# Patient Record
Sex: MALE | Race: WHITE | NOT HISPANIC OR LATINO | Employment: FULL TIME | URBAN - METROPOLITAN AREA
[De-identification: names, ages, dates, MRNs, and addresses within clinical notes are randomized per-mention and may not be internally consistent; named-entity substitution may affect disease eponyms.]

---

## 2021-11-01 ENCOUNTER — NON-PROVIDER VISIT (OUTPATIENT)
Dept: OCCUPATIONAL MEDICINE | Facility: CLINIC | Age: 21
End: 2021-11-01

## 2021-11-01 DIAGNOSIS — Z02.83 ENCOUNTER FOR DRUG SCREENING: Primary | ICD-10-CM

## 2021-11-01 PROCEDURE — 8907 PR URINE COLLECT ONLY: Performed by: PREVENTIVE MEDICINE

## 2021-11-02 ENCOUNTER — OFFICE VISIT (OUTPATIENT)
Dept: OCCUPATIONAL MEDICINE | Facility: CLINIC | Age: 21
End: 2021-11-02

## 2021-11-02 VITALS
DIASTOLIC BLOOD PRESSURE: 72 MMHG | WEIGHT: 236 LBS | BODY MASS INDEX: 30.29 KG/M2 | OXYGEN SATURATION: 97 % | HEIGHT: 74 IN | SYSTOLIC BLOOD PRESSURE: 116 MMHG | HEART RATE: 68 BPM | RESPIRATION RATE: 16 BRPM

## 2021-11-02 DIAGNOSIS — Z02.4 ENCOUNTER FOR COMMERCIAL DRIVER MEDICAL EXAMINATION (CDME): ICD-10-CM

## 2021-11-02 PROCEDURE — 7100 PR DOT PHYSICAL: Performed by: PREVENTIVE MEDICINE

## 2022-06-01 ENCOUNTER — APPOINTMENT (OUTPATIENT)
Dept: RADIOLOGY | Facility: MEDICAL CENTER | Age: 22
End: 2022-06-01
Attending: EMERGENCY MEDICINE
Payer: COMMERCIAL

## 2022-06-01 ENCOUNTER — HOSPITAL ENCOUNTER (OUTPATIENT)
Facility: MEDICAL CENTER | Age: 22
End: 2022-06-02
Attending: EMERGENCY MEDICINE | Admitting: INTERNAL MEDICINE
Payer: COMMERCIAL

## 2022-06-01 DIAGNOSIS — K92.2 GASTROINTESTINAL HEMORRHAGE, UNSPECIFIED GASTROINTESTINAL HEMORRHAGE TYPE: Primary | ICD-10-CM

## 2022-06-01 DIAGNOSIS — Q43.0 MECKEL DIVERTICULUM: ICD-10-CM

## 2022-06-01 DIAGNOSIS — D72.829 LEUKOCYTOSIS, UNSPECIFIED TYPE: ICD-10-CM

## 2022-06-01 DIAGNOSIS — R19.5 LOOSE STOOLS: ICD-10-CM

## 2022-06-01 DIAGNOSIS — R10.9 ACUTE ABDOMINAL PAIN: ICD-10-CM

## 2022-06-01 PROBLEM — R11.2 NAUSEA & VOMITING: Status: ACTIVE | Noted: 2022-06-01

## 2022-06-01 LAB
ALBUMIN SERPL BCP-MCNC: 4.8 G/DL (ref 3.2–4.9)
ALBUMIN/GLOB SERPL: 1.7 G/DL
ALP SERPL-CCNC: 79 U/L (ref 30–99)
ALT SERPL-CCNC: 22 U/L (ref 2–50)
ANION GAP SERPL CALC-SCNC: 12 MMOL/L (ref 7–16)
APPEARANCE UR: CLEAR
AST SERPL-CCNC: 26 U/L (ref 12–45)
BASOPHILS # BLD AUTO: 0.3 % (ref 0–1.8)
BASOPHILS # BLD: 0.04 K/UL (ref 0–0.12)
BILIRUB SERPL-MCNC: 0.8 MG/DL (ref 0.1–1.5)
BILIRUB UR QL STRIP.AUTO: NEGATIVE
BUN SERPL-MCNC: 17 MG/DL (ref 8–22)
CALCIUM SERPL-MCNC: 9.3 MG/DL (ref 8.5–10.5)
CHLORIDE SERPL-SCNC: 101 MMOL/L (ref 96–112)
CK SERPL-CCNC: 155 U/L (ref 0–154)
CO2 SERPL-SCNC: 24 MMOL/L (ref 20–33)
COLOR UR: YELLOW
CREAT SERPL-MCNC: 1.02 MG/DL (ref 0.5–1.4)
EOSINOPHIL # BLD AUTO: 0.02 K/UL (ref 0–0.51)
EOSINOPHIL NFR BLD: 0.2 % (ref 0–6.9)
ERYTHROCYTE [DISTWIDTH] IN BLOOD BY AUTOMATED COUNT: 38.7 FL (ref 35.9–50)
FLUAV RNA SPEC QL NAA+PROBE: NEGATIVE
FLUBV RNA SPEC QL NAA+PROBE: NEGATIVE
G LAMBLIA+C PARVUM AG STL QL RAPID: NORMAL
GFR SERPLBLD CREATININE-BSD FMLA CKD-EPI: 106 ML/MIN/1.73 M 2
GLOBULIN SER CALC-MCNC: 2.8 G/DL (ref 1.9–3.5)
GLUCOSE SERPL-MCNC: 114 MG/DL (ref 65–99)
GLUCOSE UR STRIP.AUTO-MCNC: NEGATIVE MG/DL
HCT VFR BLD AUTO: 47.7 % (ref 42–52)
HEMOCCULT STL QL: POSITIVE
HGB BLD-MCNC: 16.9 G/DL (ref 14–18)
IMM GRANULOCYTES # BLD AUTO: 0.03 K/UL (ref 0–0.11)
IMM GRANULOCYTES NFR BLD AUTO: 0.3 % (ref 0–0.9)
KETONES UR STRIP.AUTO-MCNC: NEGATIVE MG/DL
LEUKOCYTE ESTERASE UR QL STRIP.AUTO: NEGATIVE
LIPASE SERPL-CCNC: 19 U/L (ref 11–82)
LYMPHOCYTES # BLD AUTO: 0.49 K/UL (ref 1–4.8)
LYMPHOCYTES NFR BLD: 4.2 % (ref 22–41)
MCH RBC QN AUTO: 30.6 PG (ref 27–33)
MCHC RBC AUTO-ENTMCNC: 35.4 G/DL (ref 33.7–35.3)
MCV RBC AUTO: 86.3 FL (ref 81.4–97.8)
MICRO URNS: NORMAL
MONOCYTES # BLD AUTO: 0.46 K/UL (ref 0–0.85)
MONOCYTES NFR BLD AUTO: 3.9 % (ref 0–13.4)
NEUTROPHILS # BLD AUTO: 10.67 K/UL (ref 1.82–7.42)
NEUTROPHILS NFR BLD: 91.1 % (ref 44–72)
NITRITE UR QL STRIP.AUTO: NEGATIVE
NRBC # BLD AUTO: 0 K/UL
NRBC BLD-RTO: 0 /100 WBC
PH UR STRIP.AUTO: 7.5 [PH] (ref 5–8)
PLATELET # BLD AUTO: 247 K/UL (ref 164–446)
PMV BLD AUTO: 8.8 FL (ref 9–12.9)
POTASSIUM SERPL-SCNC: 4.5 MMOL/L (ref 3.6–5.5)
PROT SERPL-MCNC: 7.6 G/DL (ref 6–8.2)
PROT UR QL STRIP: NEGATIVE MG/DL
RBC # BLD AUTO: 5.53 M/UL (ref 4.7–6.1)
RBC UR QL AUTO: NEGATIVE
SARS-COV-2 RNA RESP QL NAA+PROBE: NOTDETECTED
SIGNIFICANT IND 70042: NORMAL
SITE SITE: NORMAL
SODIUM SERPL-SCNC: 137 MMOL/L (ref 135–145)
SOURCE SOURCE: NORMAL
SP GR UR STRIP.AUTO: 1.03
SPECIMEN SOURCE: NORMAL
UROBILINOGEN UR STRIP.AUTO-MCNC: 0.2 MG/DL
WBC # BLD AUTO: 11.7 K/UL (ref 4.8–10.8)

## 2022-06-01 PROCEDURE — 87329 GIARDIA AG IA: CPT

## 2022-06-01 PROCEDURE — 82272 OCCULT BLD FECES 1-3 TESTS: CPT

## 2022-06-01 PROCEDURE — 83690 ASSAY OF LIPASE: CPT

## 2022-06-01 PROCEDURE — 99219 PR INITIAL OBSERVATION CARE,LEVL II: CPT | Performed by: INTERNAL MEDICINE

## 2022-06-01 PROCEDURE — 96376 TX/PRO/DX INJ SAME DRUG ADON: CPT

## 2022-06-01 PROCEDURE — G0378 HOSPITAL OBSERVATION PER HR: HCPCS

## 2022-06-01 PROCEDURE — C9803 HOPD COVID-19 SPEC COLLECT: HCPCS | Performed by: EMERGENCY MEDICINE

## 2022-06-01 PROCEDURE — 700105 HCHG RX REV CODE 258: Performed by: INTERNAL MEDICINE

## 2022-06-01 PROCEDURE — 700111 HCHG RX REV CODE 636 W/ 250 OVERRIDE (IP): Performed by: EMERGENCY MEDICINE

## 2022-06-01 PROCEDURE — A9512 TC99M PERTECHNETATE: HCPCS

## 2022-06-01 PROCEDURE — 82550 ASSAY OF CK (CPK): CPT

## 2022-06-01 PROCEDURE — 0240U HCHG SARS-COV-2 COVID-19 NFCT DS RESP RNA 3 TRGT MIC: CPT

## 2022-06-01 PROCEDURE — 99285 EMERGENCY DEPT VISIT HI MDM: CPT

## 2022-06-01 PROCEDURE — 700111 HCHG RX REV CODE 636 W/ 250 OVERRIDE (IP): Performed by: INTERNAL MEDICINE

## 2022-06-01 PROCEDURE — 700105 HCHG RX REV CODE 258: Performed by: EMERGENCY MEDICINE

## 2022-06-01 PROCEDURE — 96375 TX/PRO/DX INJ NEW DRUG ADDON: CPT

## 2022-06-01 PROCEDURE — 36415 COLL VENOUS BLD VENIPUNCTURE: CPT

## 2022-06-01 PROCEDURE — 80053 COMPREHEN METABOLIC PANEL: CPT

## 2022-06-01 PROCEDURE — 700117 HCHG RX CONTRAST REV CODE 255: Performed by: EMERGENCY MEDICINE

## 2022-06-01 PROCEDURE — 96374 THER/PROPH/DIAG INJ IV PUSH: CPT

## 2022-06-01 PROCEDURE — 74177 CT ABD & PELVIS W/CONTRAST: CPT

## 2022-06-01 PROCEDURE — 85025 COMPLETE CBC W/AUTO DIFF WBC: CPT

## 2022-06-01 PROCEDURE — 87328 CRYPTOSPORIDIUM AG IA: CPT

## 2022-06-01 PROCEDURE — 81003 URINALYSIS AUTO W/O SCOPE: CPT

## 2022-06-01 RX ORDER — PROMETHAZINE HYDROCHLORIDE 12.5 MG/1
12.5-25 SUPPOSITORY RECTAL EVERY 4 HOURS PRN
Status: DISCONTINUED | OUTPATIENT
Start: 2022-06-01 | End: 2022-06-02 | Stop reason: HOSPADM

## 2022-06-01 RX ORDER — ONDANSETRON 2 MG/ML
4 INJECTION INTRAMUSCULAR; INTRAVENOUS ONCE
Status: COMPLETED | OUTPATIENT
Start: 2022-06-01 | End: 2022-06-01

## 2022-06-01 RX ORDER — AMOXICILLIN 250 MG
2 CAPSULE ORAL 2 TIMES DAILY
Status: DISCONTINUED | OUTPATIENT
Start: 2022-06-01 | End: 2022-06-02 | Stop reason: HOSPADM

## 2022-06-01 RX ORDER — POLYETHYLENE GLYCOL 3350 17 G/17G
1 POWDER, FOR SOLUTION ORAL
Status: DISCONTINUED | OUTPATIENT
Start: 2022-06-01 | End: 2022-06-02 | Stop reason: HOSPADM

## 2022-06-01 RX ORDER — SODIUM CHLORIDE, SODIUM LACTATE, POTASSIUM CHLORIDE, CALCIUM CHLORIDE 600; 310; 30; 20 MG/100ML; MG/100ML; MG/100ML; MG/100ML
1000 INJECTION, SOLUTION INTRAVENOUS ONCE
Status: COMPLETED | OUTPATIENT
Start: 2022-06-01 | End: 2022-06-01

## 2022-06-01 RX ORDER — PROMETHAZINE HYDROCHLORIDE 25 MG/1
12.5-25 TABLET ORAL EVERY 4 HOURS PRN
Status: DISCONTINUED | OUTPATIENT
Start: 2022-06-01 | End: 2022-06-02 | Stop reason: HOSPADM

## 2022-06-01 RX ORDER — KETOROLAC TROMETHAMINE 30 MG/ML
15 INJECTION, SOLUTION INTRAMUSCULAR; INTRAVENOUS ONCE
Status: COMPLETED | OUTPATIENT
Start: 2022-06-01 | End: 2022-06-01

## 2022-06-01 RX ORDER — HYDRALAZINE HYDROCHLORIDE 20 MG/ML
10 INJECTION INTRAMUSCULAR; INTRAVENOUS EVERY 4 HOURS PRN
Status: DISCONTINUED | OUTPATIENT
Start: 2022-06-01 | End: 2022-06-02 | Stop reason: HOSPADM

## 2022-06-01 RX ORDER — HYDROMORPHONE HYDROCHLORIDE 1 MG/ML
1 INJECTION, SOLUTION INTRAMUSCULAR; INTRAVENOUS; SUBCUTANEOUS ONCE
Status: COMPLETED | OUTPATIENT
Start: 2022-06-01 | End: 2022-06-01

## 2022-06-01 RX ORDER — PROCHLORPERAZINE EDISYLATE 5 MG/ML
5-10 INJECTION INTRAMUSCULAR; INTRAVENOUS EVERY 4 HOURS PRN
Status: DISCONTINUED | OUTPATIENT
Start: 2022-06-01 | End: 2022-06-02 | Stop reason: HOSPADM

## 2022-06-01 RX ORDER — ACETAMINOPHEN 325 MG/1
650 TABLET ORAL EVERY 6 HOURS PRN
Status: DISCONTINUED | OUTPATIENT
Start: 2022-06-01 | End: 2022-06-02 | Stop reason: HOSPADM

## 2022-06-01 RX ORDER — SODIUM CHLORIDE 9 MG/ML
INJECTION, SOLUTION INTRAVENOUS CONTINUOUS
Status: DISCONTINUED | OUTPATIENT
Start: 2022-06-01 | End: 2022-06-02 | Stop reason: HOSPADM

## 2022-06-01 RX ORDER — MORPHINE SULFATE 4 MG/ML
2 INJECTION INTRAVENOUS
Status: DISCONTINUED | OUTPATIENT
Start: 2022-06-01 | End: 2022-06-02 | Stop reason: HOSPADM

## 2022-06-01 RX ORDER — ONDANSETRON 2 MG/ML
4 INJECTION INTRAMUSCULAR; INTRAVENOUS EVERY 4 HOURS PRN
Status: DISCONTINUED | OUTPATIENT
Start: 2022-06-01 | End: 2022-06-02 | Stop reason: HOSPADM

## 2022-06-01 RX ORDER — ONDANSETRON 4 MG/1
4 TABLET, ORALLY DISINTEGRATING ORAL EVERY 4 HOURS PRN
Status: DISCONTINUED | OUTPATIENT
Start: 2022-06-01 | End: 2022-06-02 | Stop reason: HOSPADM

## 2022-06-01 RX ORDER — BISACODYL 10 MG
10 SUPPOSITORY, RECTAL RECTAL
Status: DISCONTINUED | OUTPATIENT
Start: 2022-06-01 | End: 2022-06-02 | Stop reason: HOSPADM

## 2022-06-01 RX ADMIN — KETOROLAC TROMETHAMINE 15 MG: 30 INJECTION, SOLUTION INTRAMUSCULAR at 12:27

## 2022-06-01 RX ADMIN — FAMOTIDINE 20 MG: 10 INJECTION INTRAVENOUS at 17:24

## 2022-06-01 RX ADMIN — SODIUM CHLORIDE, POTASSIUM CHLORIDE, SODIUM LACTATE AND CALCIUM CHLORIDE 1000 ML: 600; 310; 30; 20 INJECTION, SOLUTION INTRAVENOUS at 11:55

## 2022-06-01 RX ADMIN — FAMOTIDINE 20 MG: 10 INJECTION, SOLUTION INTRAVENOUS at 11:55

## 2022-06-01 RX ADMIN — IOHEXOL 80 ML: 350 INJECTION, SOLUTION INTRAVENOUS at 13:11

## 2022-06-01 RX ADMIN — SODIUM CHLORIDE: 9 INJECTION, SOLUTION INTRAVENOUS at 19:55

## 2022-06-01 RX ADMIN — ONDANSETRON 4 MG: 2 INJECTION INTRAMUSCULAR; INTRAVENOUS at 15:26

## 2022-06-01 RX ADMIN — ONDANSETRON 4 MG: 2 INJECTION INTRAMUSCULAR; INTRAVENOUS at 20:01

## 2022-06-01 RX ADMIN — ONDANSETRON 4 MG: 2 INJECTION INTRAMUSCULAR; INTRAVENOUS at 11:55

## 2022-06-01 RX ADMIN — HYDROMORPHONE HYDROCHLORIDE 1 MG: 1 INJECTION, SOLUTION INTRAMUSCULAR; INTRAVENOUS; SUBCUTANEOUS at 14:31

## 2022-06-01 ASSESSMENT — LIFESTYLE VARIABLES
ALCOHOL_USE: YES
HAVE YOU EVER FELT YOU SHOULD CUT DOWN ON YOUR DRINKING: NO
CONSUMPTION TOTAL: NEGATIVE
DOES PATIENT WANT TO STOP DRINKING: NO
TOTAL SCORE: 0
EVER HAD A DRINK FIRST THING IN THE MORNING TO STEADY YOUR NERVES TO GET RID OF A HANGOVER: NO
AVERAGE NUMBER OF DAYS PER WEEK YOU HAVE A DRINK CONTAINING ALCOHOL: 2
TOTAL SCORE: 0
TOTAL SCORE: 0
ON A TYPICAL DAY WHEN YOU DRINK ALCOHOL HOW MANY DRINKS DO YOU HAVE: 3
HOW MANY TIMES IN THE PAST YEAR HAVE YOU HAD 5 OR MORE DRINKS IN A DAY: 0
EVER FELT BAD OR GUILTY ABOUT YOUR DRINKING: NO
HAVE PEOPLE ANNOYED YOU BY CRITICIZING YOUR DRINKING: NO

## 2022-06-01 ASSESSMENT — ENCOUNTER SYMPTOMS
BLOOD IN STOOL: 1
BRUISES/BLEEDS EASILY: 0
COUGH: 0
HEADACHES: 1
DIZZINESS: 1
DOUBLE VISION: 0
NAUSEA: 1
BLURRED VISION: 0
FEVER: 0
MYALGIAS: 0
VOMITING: 1
ABDOMINAL PAIN: 1
HEARTBURN: 1
DIARRHEA: 1
NECK PAIN: 1
PALPITATIONS: 0
CHILLS: 0

## 2022-06-01 ASSESSMENT — PATIENT HEALTH QUESTIONNAIRE - PHQ9
1. LITTLE INTEREST OR PLEASURE IN DOING THINGS: NOT AT ALL
SUM OF ALL RESPONSES TO PHQ9 QUESTIONS 1 AND 2: 0
2. FEELING DOWN, DEPRESSED, IRRITABLE, OR HOPELESS: NOT AT ALL
1. LITTLE INTEREST OR PLEASURE IN DOING THINGS: NOT AT ALL
2. FEELING DOWN, DEPRESSED, IRRITABLE, OR HOPELESS: NOT AT ALL
SUM OF ALL RESPONSES TO PHQ9 QUESTIONS 1 AND 2: 0

## 2022-06-01 ASSESSMENT — PAIN DESCRIPTION - PAIN TYPE
TYPE: ACUTE PAIN
TYPE: ACUTE PAIN

## 2022-06-01 ASSESSMENT — FIBROSIS 4 INDEX: FIB4 SCORE: 0.49

## 2022-06-01 NOTE — ED TRIAGE NOTES
"Chief Complaint   Patient presents with   • Nausea/Vomiting/Diarrhea     Diarrhea x5 months. He now notes that stool is liquid and black.    • Flank Pain     Bilateral that started this AM. Denies pain with urination or blood in urine   • Dizziness   • Headache       Patient to triage ambulatory with a steady gait, AAOx4, Appropriate precautions in place.     Explained wait time and triage process. Placed back in lobby. Told to notify ED tech or RN of any changes, verbalized understanding.    /82   Pulse (!) 109   Temp 36.3 °C (97.3 °F) (Temporal)   Resp 14   Ht 1.88 m (6' 2\")   Wt 111 kg (245 lb 9.5 oz)   SpO2 98%   BMI 31.53 kg/m²     "

## 2022-06-01 NOTE — ED PROVIDER NOTES
ED Provider Note    CHIEF COMPLAINT  Chief Complaint   Patient presents with   • Nausea/Vomiting/Diarrhea     Diarrhea x5 months. He now notes that stool is liquid and black.    • Flank Pain     Bilateral that started this AM. Denies pain with urination or blood in urine   • Dizziness   • Headache       HPI    Primary care provider: None  Means of arrival: POV  History obtained from: Patient and Mother  History limited by: Nothing    Mando Saleh is a 21 y.o. male who presents with loose stools.  Fairly constant over the last 5 months.  Rarely has had a formed bowel movement in that time window.  Has not seen a doctor for this yet.  Since last night after eating sushi he has had several episodes of emesis and nausea.  He also has bilateral low back/flank pain.  No alleviating measures noted.  No daily meds.  No surgical history.  No falls or injuries or trauma.  Diarrhea today was looser than usual and darker in color almost black.  No bright red blood per rectum.  No family history of IBD or early colon malignancy.  No aggravating factors noted.    REVIEW OF SYSTEMS  Constitutional: Negative for fever or chills.   HENT: Negative for rhinorrhea or sore throat.  Respiratory: Negative for cough or shortness of breath.    Cardiovascular: Negative for chest pain or syncope.   Gastrointestinal: Positive for loose stools chronically for 5 months, darker today, and with new nausea and vomiting since last night.  Genitourinary: Negative for dysuria or flank pain.   Musculoskeletal: Positive for bilateral low back pain, negative for extremity pain or swelling.  Skin: Negative for itching or rash.   Neurological: Negative for sensory or motor changes.   See HPI for further details. All other systems are negative.     PAST MEDICAL HISTORY  No daily meds, loose stools over the last 5 months.    PAST FAMILY HISTORY  History reviewed. No pertinent family history.    SOCIAL HISTORY  Social History     Tobacco Use   • Smoking  "status: Never Smoker   • Smokeless tobacco: Never Used   Substance and Sexual Activity   • Alcohol use: Yes     Comment: 1-2   • Drug use: Never   • Sexual activity: Not on file       SURGICAL HISTORY  patient denies any surgical history    CURRENT MEDICATIONS  No daily meds.    ALLERGIES  Allergies   Allergen Reactions   • Pertussis Vaccine        PHYSICAL EXAM  VITAL SIGNS: BP (!) 99/55   Pulse 99   Temp 36.7 °C (98.1 °F) (Temporal)   Resp 16   Ht 1.88 m (6' 2\")   Wt 111 kg (245 lb 9.5 oz)   SpO2 92%   BMI 31.53 kg/m²    Pulse ox interpretation: On room air, I interpret this pulse ox as normal.  Constitutional: Well-developed, well-nourished. Sitting up.   HEENT: Normocephalic, atraumatic. Posterior pharynx clear, mucous membranes quite dry.  Eyes:  EOMI. Normal sclerae.  Neck: Supple, nontender.  Chest/Pulmonary: Clear to ausculation bilaterally, no wheezes or rhonchi.  Cardiovascular: Regular rate and rhythm, no murmur.   Abdomen: Soft, mild generalized tenderness; no rebound, guarding, or masses.  Back: No CVA or midline tenderness.   Musculoskeletal: No deformity or edema.  Neuro: Clear speech, alert, no focal weakness or asymmetry.  Psych: Normal mood and affect.  Skin: No rashes, warm and dry.      DIAGNOSTIC STUDIES / PROCEDURES    LABS & EKG  Results for orders placed or performed during the hospital encounter of 06/01/22   CBC WITH DIFFERENTIAL   Result Value Ref Range    WBC 11.7 (H) 4.8 - 10.8 K/uL    RBC 5.53 4.70 - 6.10 M/uL    Hemoglobin 16.9 14.0 - 18.0 g/dL    Hematocrit 47.7 42.0 - 52.0 %    MCV 86.3 81.4 - 97.8 fL    MCH 30.6 27.0 - 33.0 pg    MCHC 35.4 (H) 33.7 - 35.3 g/dL    RDW 38.7 35.9 - 50.0 fL    Platelet Count 247 164 - 446 K/uL    MPV 8.8 (L) 9.0 - 12.9 fL    Neutrophils-Polys 91.10 (H) 44.00 - 72.00 %    Lymphocytes 4.20 (L) 22.00 - 41.00 %    Monocytes 3.90 0.00 - 13.40 %    Eosinophils 0.20 0.00 - 6.90 %    Basophils 0.30 0.00 - 1.80 %    Immature Granulocytes 0.30 0.00 - 0.90 " %    Nucleated RBC 0.00 /100 WBC    Neutrophils (Absolute) 10.67 (H) 1.82 - 7.42 K/uL    Lymphs (Absolute) 0.49 (L) 1.00 - 4.80 K/uL    Monos (Absolute) 0.46 0.00 - 0.85 K/uL    Eos (Absolute) 0.02 0.00 - 0.51 K/uL    Baso (Absolute) 0.04 0.00 - 0.12 K/uL    Immature Granulocytes (abs) 0.03 0.00 - 0.11 K/uL    NRBC (Absolute) 0.00 K/uL   COMP METABOLIC PANEL   Result Value Ref Range    Sodium 137 135 - 145 mmol/L    Potassium 4.5 3.6 - 5.5 mmol/L    Chloride 101 96 - 112 mmol/L    Co2 24 20 - 33 mmol/L    Anion Gap 12.0 7.0 - 16.0    Glucose 114 (H) 65 - 99 mg/dL    Bun 17 8 - 22 mg/dL    Creatinine 1.02 0.50 - 1.40 mg/dL    Calcium 9.3 8.5 - 10.5 mg/dL    AST(SGOT) 26 12 - 45 U/L    ALT(SGPT) 22 2 - 50 U/L    Alkaline Phosphatase 79 30 - 99 U/L    Total Bilirubin 0.8 0.1 - 1.5 mg/dL    Albumin 4.8 3.2 - 4.9 g/dL    Total Protein 7.6 6.0 - 8.2 g/dL    Globulin 2.8 1.9 - 3.5 g/dL    A-G Ratio 1.7 g/dL   LIPASE   Result Value Ref Range    Lipase 19 11 - 82 U/L   URINALYSIS    Specimen: Urine, Clean Catch   Result Value Ref Range    Color Yellow     Character Clear     Specific Gravity 1.028 <1.035    Ph 7.5 5.0 - 8.0    Glucose Negative Negative mg/dL    Ketones Negative Negative mg/dL    Protein Negative Negative mg/dL    Bilirubin Negative Negative    Urobilinogen, Urine 0.2 Negative    Nitrite Negative Negative    Leukocyte Esterase Negative Negative    Occult Blood Negative Negative    Micro Urine Req see below    ESTIMATED GFR   Result Value Ref Range    GFR (CKD-EPI) 106 >60 mL/min/1.73 m 2   OCCULT BLOOD STOOL   Result Value Ref Range    Occult Blood Feces Positive (A) Negative   CoV-2 and Flu A/B by PCR (Roche/[employer])    Specimen: Nasopharyngeal; Respirate   Result Value Ref Range    SARS-CoV-2 Source NP Swab    CREATINE KINASE   Result Value Ref Range    CPK Total 155 (H) 0 - 154 U/L   CRYPTO/GIARDIA RAPID ASSAY    Specimen: Stool   Result Value Ref Range    Significant Indicator NEG     Source STL     Site -      Ova And Parasites Antigen Eia -        RADIOLOGY  CT-ABDOMEN-PELVIS WITH   Final Result      There is a fluid-containing blind ending pouch arising from the antimesenteric side of the distal ileum. Tracking along its lateral margin is a normal-appearing appendix. This could be a Meckel's diverticulum. If there is clinical suspicion a Tc-NA    pertechnetate scan could be of use.      NM-MECKELS SCAN    (Results Pending)       COURSE & MEDICAL DECISION MAKING    This is a 21 y.o. male who presents with loose stools for months now with nausea and vomiting and melenic appearing stools for the last day.    Differential Diagnosis includes but is not limited to:  IBD, IBS, infection, GI bleed, peptic ulcer disease    ED Course:  21-year-old male with above concerning presentation.  Hemoccult positive melenic loose stools, white count slightly elevated plan advanced imaging since he has no outpatient follow-up.  No indication for transfusion at this time.  IV antacids and pain medications given.    Interestingly, the patient has what appears to be a possible Meckel's diverticulum on his CT.  Given he does have some active bleeding, his abdomen is benign on serial reassessments and his vitals are stable but I plan to hospitalize the patient for further work-up with a nuclear medicine study and then definitive care.  Hospitalist consulted they will kindly admit for further work-up and treatment.  The patient is hemodynamically stable for admission in guarded condition.  I will continue IV meds for symptom relief, patient and mother understand and agree with plan of care.    Medications   morphine 4 MG/ML injection 2 mg (2 mg Intravenous Wasted 6/1/22 1542)   lactated ringers (LR) bolus (0 mL Intravenous Stopped 6/1/22 1447)   ondansetron (ZOFRAN) syringe/vial injection 4 mg (4 mg Intravenous Given 6/1/22 1155)   famotidine (PEPCID) injection 20 mg (20 mg Intravenous Given 6/1/22 1155)   ketorolac (TORADOL) injection 15 mg (15  mg Intravenous Given 6/1/22 1227)   iohexol (OMNIPAQUE) 350 mg/mL (IV) (80 mL Intravenous Given 6/1/22 1311)   HYDROmorphone (Dilaudid) injection 1 mg (1 mg Intravenous Given 6/1/22 1431)   ondansetron (ZOFRAN) syringe/vial injection 4 mg (4 mg Intravenous Given 6/1/22 1526)       FINAL IMPRESSION  1. Gastrointestinal hemorrhage, unspecified gastrointestinal hemorrhage type    2. Acute abdominal pain    3. Loose stools    4. Meckel diverticulum    5. Leukocytosis, unspecified type        -ADMIT-      Pertinent Labs & Imaging studies reviewed and verified by myself, as well as nursing notes and the patient's past medical, family, and social histories (See chart for details).    Portions of this record were made with voice recognition software.  Despite my review, spelling/grammar/context errors may still remain.  Interpretation of this chart should be taken in this context.    Electronically signed by Shawn Santiago M.D. on 6/1/2022 at 4:19 PM.

## 2022-06-01 NOTE — PROGRESS NOTES
Patient needs to be pre-treated for Meckle's Scan per Radiologist with 50 mg ranitidine over 20 minutes intravenous. JOHNATHAN Parry notified

## 2022-06-01 NOTE — PROGRESS NOTES
Report received from JOHNATHAN Parry.  Assumed care of patient.  Agree with prior RN's assessment.  Patient sitting up in bed.  All concerns addressed.  Patient A & O  X 4.  No apparent signs of distress.  Safety precautions in place.  Patient educated to call for assistance.  Hourly rounding in place.

## 2022-06-01 NOTE — ED NOTES
Patient medicated per MAR for nausea with relief reported, asking for water, complaining of increasing pain, Admitting MD notified, awaiting response.

## 2022-06-01 NOTE — CARE PLAN
Assumed care of patient from ED.  Patient AAO x 4, pleasant, on RA, c/o 3/10 pain in back...s/p pain medication from ED RN. No c/o N/V at this time.  Patient aware of need for NM Meckel's scan...to remain NPO.  Patient in need of Ranitidine 50 mg over 20 minutes per NM...attending (Dr. Erickson) made aware and to place orders. RN to notify NM one hour s/p administration.  No further needs at this time.  Call bell and belongings within reach. Mother at bedside.  Will continue to monitor.    Hand-off report given to JOHNATHAN Wells.            4 Eyes Skin Assessment Completed by JOHNATHAN Parry and JOHNATHAN Wells.    Head WDL  Ears WDL  Nose WDL  Mouth WDL  Neck WDL  Breast/Chest WDL  Shoulder Blades WDL  Spine WDL  (R) Arm/Elbow/Hand WDL  (L) Arm/Elbow/Hand WDL  Abdomen WDL  Groin WDL  Scrotum/Coccyx/Buttocks WDL  (R) Leg WDL  (L) Leg WDL  (R) Heel/Foot/Toe WDL  (L) Heel/Foot/Toe WDL    Devices In Places Pulse Ox      Interventions In Place Pillows    Possible Skin Injury No    Pictures Uploaded Into Epic N/A  Wound Consult Placed N/A  RN Wound Prevention Protocol Ordered No              The patient is Stable - Low risk of patient condition declining or worsening    Shift Goals  Clinical Goals: NM Meckel's, N/V/pain control  Patient Goals: N/V/pain control, rest  Family Goals: N/V/pain control, rest    Progress made toward(s) clinical / shift goals:    Problem: Pain - Standard  Goal: Alleviation of pain or a reduction in pain to the patient’s comfort goal  Outcome: Progressing     Problem: Knowledge Deficit - Standard  Goal: Patient and family/care givers will demonstrate understanding of plan of care, disease process/condition, diagnostic tests and medications  Outcome: Progressing       Patient is not progressing towards the following goals:

## 2022-06-02 VITALS
OXYGEN SATURATION: 93 % | WEIGHT: 245.81 LBS | HEIGHT: 74 IN | DIASTOLIC BLOOD PRESSURE: 57 MMHG | RESPIRATION RATE: 18 BRPM | SYSTOLIC BLOOD PRESSURE: 112 MMHG | BODY MASS INDEX: 31.55 KG/M2 | HEART RATE: 84 BPM | TEMPERATURE: 98.2 F

## 2022-06-02 PROBLEM — R11.2 NAUSEA & VOMITING: Status: RESOLVED | Noted: 2022-06-01 | Resolved: 2022-06-02

## 2022-06-02 LAB
ANION GAP SERPL CALC-SCNC: 10 MMOL/L (ref 7–16)
BUN SERPL-MCNC: 18 MG/DL (ref 8–22)
CALCIUM SERPL-MCNC: 8.6 MG/DL (ref 8.5–10.5)
CHLORIDE SERPL-SCNC: 104 MMOL/L (ref 96–112)
CO2 SERPL-SCNC: 23 MMOL/L (ref 20–33)
CREAT SERPL-MCNC: 1.19 MG/DL (ref 0.5–1.4)
ERYTHROCYTE [DISTWIDTH] IN BLOOD BY AUTOMATED COUNT: 39.7 FL (ref 35.9–50)
GFR SERPLBLD CREATININE-BSD FMLA CKD-EPI: 88 ML/MIN/1.73 M 2
GLUCOSE SERPL-MCNC: 102 MG/DL (ref 65–99)
HCT VFR BLD AUTO: 41.6 % (ref 42–52)
HGB BLD-MCNC: 14.6 G/DL (ref 14–18)
MCH RBC QN AUTO: 30.9 PG (ref 27–33)
MCHC RBC AUTO-ENTMCNC: 35.1 G/DL (ref 33.7–35.3)
MCV RBC AUTO: 88.1 FL (ref 81.4–97.8)
PLATELET # BLD AUTO: 202 K/UL (ref 164–446)
PMV BLD AUTO: 8.9 FL (ref 9–12.9)
POTASSIUM SERPL-SCNC: 4.1 MMOL/L (ref 3.6–5.5)
RBC # BLD AUTO: 4.72 M/UL (ref 4.7–6.1)
SODIUM SERPL-SCNC: 137 MMOL/L (ref 135–145)
WBC # BLD AUTO: 6.3 K/UL (ref 4.8–10.8)

## 2022-06-02 PROCEDURE — 83630 LACTOFERRIN FECAL (QUAL): CPT

## 2022-06-02 PROCEDURE — 700111 HCHG RX REV CODE 636 W/ 250 OVERRIDE (IP): Performed by: INTERNAL MEDICINE

## 2022-06-02 PROCEDURE — 99217 PR OBSERVATION CARE DISCHARGE: CPT | Performed by: NURSE PRACTITIONER

## 2022-06-02 PROCEDURE — 87045 FECES CULTURE AEROBIC BACT: CPT

## 2022-06-02 PROCEDURE — 87899 AGENT NOS ASSAY W/OPTIC: CPT

## 2022-06-02 PROCEDURE — 80048 BASIC METABOLIC PNL TOTAL CA: CPT

## 2022-06-02 PROCEDURE — 85027 COMPLETE CBC AUTOMATED: CPT

## 2022-06-02 PROCEDURE — 96376 TX/PRO/DX INJ SAME DRUG ADON: CPT

## 2022-06-02 PROCEDURE — 700105 HCHG RX REV CODE 258: Performed by: INTERNAL MEDICINE

## 2022-06-02 PROCEDURE — G0378 HOSPITAL OBSERVATION PER HR: HCPCS

## 2022-06-02 RX ORDER — OMEPRAZOLE 20 MG/1
20 CAPSULE, DELAYED RELEASE ORAL DAILY
Qty: 30 CAPSULE | Refills: 0 | Status: SHIPPED | OUTPATIENT
Start: 2022-06-02 | End: 2022-07-02

## 2022-06-02 RX ADMIN — FAMOTIDINE 20 MG: 10 INJECTION INTRAVENOUS at 06:18

## 2022-06-02 RX ADMIN — SODIUM CHLORIDE: 9 INJECTION, SOLUTION INTRAVENOUS at 02:23

## 2022-06-02 RX ADMIN — SODIUM CHLORIDE: 9 INJECTION, SOLUTION INTRAVENOUS at 09:11

## 2022-06-02 ASSESSMENT — PAIN DESCRIPTION - PAIN TYPE
TYPE: ACUTE PAIN
TYPE: ACUTE PAIN

## 2022-06-02 ASSESSMENT — PATIENT HEALTH QUESTIONNAIRE - PHQ9
1. LITTLE INTEREST OR PLEASURE IN DOING THINGS: NOT AT ALL
SUM OF ALL RESPONSES TO PHQ9 QUESTIONS 1 AND 2: 0

## 2022-06-02 NOTE — ASSESSMENT & PLAN NOTE
Nausea vomiting along with diarrhea could also be gastroenteritis after eating sushi  -We will treat symptomatically with Zofran for the nausea and vomiting  -For the diarrhea will check stool culture and sensitivities stool WBCs   -If negative loperamide could be added  -We will give him IV fluids for the dizziness

## 2022-06-02 NOTE — CARE PLAN
The patient is Stable - Low risk of patient condition declining or worsening    Shift Goals  Clinical Goals: Meckel scan results, stool sample, supportive care  Patient Goals: safety, comfort  Family Goals: safety    Progress made toward(s) clinical / shift goals:    Problem: Pain - Standard  Goal: Alleviation of pain or a reduction in pain to the patient’s comfort goal  Outcome: Progressing     Problem: Knowledge Deficit - Standard  Goal: Patient and family/care givers will demonstrate understanding of plan of care, disease process/condition, diagnostic tests and medications  Outcome: Progressing     Problem: Hemodynamics  Goal: Patient's hemodynamics, fluid balance and neurologic status will be stable or improve  Outcome: Progressing     Problem: Fluid Volume  Goal: Fluid volume balance will be maintained  Outcome: Progressing     Problem: Nutrition  Goal: Patient's nutritional and fluid intake will be adequate or improve  Outcome: Progressing     Problem: Bowel Elimination  Goal: Establish and maintain regular bowel function  Outcome: Progressing     Problem: Gastrointestinal Irritability  Goal: Nausea and vomiting will be absent or improve  Outcome: Progressing  Goal: Diarrhea will be absent or improved  Outcome: Progressing     Problem: Mobility  Goal: Patient's capacity to carry out activities will improve  Outcome: Progressing     Problem: Self Care  Goal: Patient will have the ability to perform ADLs independently or with assistance (bathe, groom, dress, toilet and feed)  Outcome: Progressing     Problem: Infection - Standard  Goal: Patient will remain free from infection  Outcome: Progressing       Patient is not progressing towards the following goals:

## 2022-06-02 NOTE — PROGRESS NOTES
I have received report from day shift RN. I have assumed care of this patient. He is A&Ox4, VSS. Denies abd pain at this time but c/o nausea. Patient has been assessed (see flow sheet) and plan of care has been discussed. Patient verbalizes understanding of plan and denies any further needs at this time. Patient is now resting in bed, bed is in the lowest position and locked, and the call light is within reach.

## 2022-06-02 NOTE — CONSULTS
Gastroenterology Consult Note     Date of Consult: 06/02/22    Requesting Physician: Mckayla Rosario, *     Reason for consult: Suspected upper GI bleed    Chief Complaint   Patient presents with   • Nausea/Vomiting/Diarrhea     Diarrhea x5 months. He now notes that stool is liquid and black.    • Flank Pain     Bilateral that started this AM. Denies pain with urination or blood in urine   • Dizziness   • Headache        History of Present Illness: A 21-year-old male with no significant past medical history presented on 6/1 with history of nausea, vomiting and diarrhea from that morning.  Patient stated that he woke up at 4:00 with nausea and vomited about 8 times associated with black watery stool about 4-5 times and severe headache and backache.  Patient states that since 3 to 4 months he started having heartburn he did not know what causing it relieved with Pepto-Bismol.  He states sometimes when he eat spicy food it will not come but sometimes when he eats spicy food and drink alcohol it comes on associated with black-colored stool sometimes, he states that he has increased frequency of stool about 3-4 times a day since 6 months.  Denies any fever, chills, weight loss, night sweats, hematemesis, cough, shortness of breath, mucus or blood in the stool.  Patient received Zofran and Pepcid which helped his nausea and vomiting but still have watery stool which is black about 2-3 times today.  Denies any kind of abdominal pain or cramping.  Patient had a stool occult blood positive, CT abdomen/pelvis showed fluid containing blind-ending pouch arising from the antimesenteric side of the distal ileum suspected Meckel's diverticulum recommended Meckel scan which is normal no evidence of Meckel's diverticulum so consulted GI for further evaluation.     Past Medical History:  History reviewed. No pertinent past medical history.     Past Surgical History:   History  reviewed. No pertinent surgical history.   Both knee ACL repair 2015 and 2017     Allergies  Pertussis vaccine     Social History:   Social History     Socioeconomic History   • Marital status: Single     Spouse name: Not on file   • Number of children: Not on file   • Years of education: Not on file   • Highest education level: Not on file   Occupational History   • Not on file   Tobacco Use   • Smoking status: Never Smoker   • Smokeless tobacco: Never Used   Substance and Sexual Activity   • Alcohol use: Yes     Comment: 1-2   • Drug use: Never   • Sexual activity: Not on file   Other Topics Concern   • Not on file   Social History Narrative   • Not on file     Social Determinants of Health     Financial Resource Strain: Not on file   Food Insecurity: Not on file   Transportation Needs: Not on file   Physical Activity: Not on file   Stress: Not on file   Social Connections: Not on file   Intimate Partner Violence: Not on file   Housing Stability: Not on file   Smoking: None  Alcohol: Drinks 2-3 times per week  Illicit drug use none     Family History:   Father  of throat cancer, uncle had heart problems     Review of Systems:  General: No fevers, chills, unintentional weight loss.  HEENT: No headache or dizziness  Cardiology: No chest pain, palpitations, orthopnea.  Respiratory: No dyspnea, cough, wheezing.  Gastrointestinal: No abdominal pain, nausea and vomiting resolved but still have watery black stool  Genitourinary: No hematuria, dysuria, urgency.  Neurologic: No changes in memory, confusion, gait instability.  Skin: No ecchymosis, jaundice, telangiectasia.    Physical Exam:  Vitals:    22 1622 22 2001 22 0359 22 0724   BP: 105/86 117/72 108/53 112/57   Pulse: 96 (!) 104 87 84   Resp: 17 18 16 18   Temp: 37 °C (98.6 °F) 37.7 °C (99.9 °F) 36.8 °C (98.3 °F) 36.8 °C (98.2 °F)   TempSrc: Temporal Temporal Temporal Temporal   SpO2: 94% 98% 93% 93%   Weight: 112 kg (245 lb 13 oz)       Height:         General: Afebrile, no distress  Head: Normocephalic, atraumatic  EENT: Scleral anicterus. Mucosa pink/moist.   Respiratory: Breath sounds present. Symmetrical rise of anterior thorax.  Cardiovascular: Normal S1, S2.  No murmur  Gastrointestinal: Soft, nontender, nondistended. Normoactive bowel sounds. No guarding.  Neurologic: Alert and oriented.  Skin: Warm and dry.      LABS:  Lab Results   Component Value Date/Time    SODIUM 137 06/02/2022 12:48 AM    POTASSIUM 4.1 06/02/2022 12:48 AM    CHLORIDE 104 06/02/2022 12:48 AM    CO2 23 06/02/2022 12:48 AM    GLUCOSE 102 (H) 06/02/2022 12:48 AM    BUN 18 06/02/2022 12:48 AM    CREATININE 1.19 06/02/2022 12:48 AM      Lab Results   Component Value Date/Time    WBC 6.3 06/02/2022 12:48 AM    RBC 4.72 06/02/2022 12:48 AM    HEMOGLOBIN 14.6 06/02/2022 12:48 AM    HEMATOCRIT 41.6 (L) 06/02/2022 12:48 AM    MCV 88.1 06/02/2022 12:48 AM    MCH 30.9 06/02/2022 12:48 AM    MCHC 35.1 06/02/2022 12:48 AM    MPV 8.9 (L) 06/02/2022 12:48 AM    NEUTSPOLYS 91.10 (H) 06/01/2022 11:05 AM    LYMPHOCYTES 4.20 (L) 06/01/2022 11:05 AM    MONOCYTES 3.90 06/01/2022 11:05 AM    EOSINOPHILS 0.20 06/01/2022 11:05 AM    BASOPHILS 0.30 06/01/2022 11:05 AM        No results found for: PROTHROMBTM, INR   Recent Labs     06/01/22  1105   ASTSGOT 26   ALTSGPT 22   TBILIRUBIN 0.8   GLOBULIN 2.8       IMAGING: Reviewed personally and summarized in HPI.    NM-MECKELS SCAN   Final Result      Normal Meckel?s scan.  No evidence of Meckel?s diverticulum.      CT-ABDOMEN-PELVIS WITH   Final Result      There is a fluid-containing blind ending pouch arising from the antimesenteric side of the distal ileum. Tracking along its lateral margin is a normal-appearing appendix. This could be a Meckel's diverticulum. If there is clinical suspicion a Tc-NA    pertechnetate scan could be of use.          Principal Problem:    GIB (gastrointestinal bleeding) POA: Yes  Active Problems:    Nausea &  vomiting POA: Unknown  Resolved Problems:    * No resolved hospital problems. *          ASSESSMENT:   A 21-year-old male with no significant past medical history but history of heartburn and melena stools on and off since 6 months admitted with a history of nausea, vomiting and watery black stool, fecal occult blood positive, CT abdomen/pelvis suspected Meckel's diverticulum but nuclear medicine scan ruled out, patient's nausea and vomiting resolved but still have melena stool-suspect upper GI bleed.    # Suspect Upper GI bleed  # GERD vs Dyspepsia  # Nausea/Vomiting: resolved     PLAN:   - Recommend PPI daily for 4 weeks.  - Can be discharged home with FU in [facility] clinic for out patient endoscopy and colonoscopy.  - Advised to stop alcohol and spicy, citrus foods.  - Will signoff    Thank you for the consultation. Please call with any questions or concerns.    Marko Lares M.D.  Internal Medicine Resident  [facility]

## 2022-06-02 NOTE — CARE PLAN
The patient is Stable - Low risk of patient condition declining or worsening    Shift Goals  Clinical Goals: test results  Patient Goals: Nuc med results  Family Goals: not present    Progress made toward(s) clinical / shift goals:    Problem: Pain - Standard  Goal: Alleviation of pain or a reduction in pain to the patient’s comfort goal  Outcome: Progressing     Problem: Knowledge Deficit - Standard  Goal: Patient and family/care givers will demonstrate understanding of plan of care, disease process/condition, diagnostic tests and medications  Outcome: Progressing       Patient is not progressing towards the following goals:

## 2022-06-02 NOTE — DISCHARGE INSTRUCTIONS
FOLLOW UP ITEMS POST DISCHARGE  Please call [phone] to schedule PCP appointment for patient.    Required specialty appointments include:       Discharge Instructions per KRISTI NolandRViancaN.    -Follow-up with PCP s/p hospitalization  -Make an appointment with Dr. Antonio, digestive health associate for an expected EGD/colonoscopy procedure  -Start taking Prilosec 20 mg daily until cleared by GI    DIET: As tolerated    ACTIVITY: As tolerated    DIAGNOSIS: N/V/D    Return to ER if symptoms persist, chest pain, palpitations, shortness of breath, numbness, tingling, weakness, and high fevers.Discharge Instructions    Discharged to home by car with relative. Discharged via wheelchair, hospital escort: Yes.  Special equipment needed: Not Applicable    Be sure to schedule a follow-up appointment with your primary care doctor or any specialists as instructed.     Discharge Plan:   Diet Plan: Discussed  Activity Level: Discussed  Confirmed Follow up Appointment: Patient to Call and Schedule Appointment  Confirmed Symptoms Management: Discussed  Medication Reconciliation Updated: Yes    I understand that a diet low in cholesterol, fat, and sodium is recommended for good health. Unless I have been given specific instructions below for another diet, I accept this instruction as my diet prescription.   Other diet:     Special Instructions: None    Is patient discharged on Warfarin / Coumadin?   No     Depression / Suicide Risk    As you are discharged from this [facility] facility, it is important to learn how to keep safe from harming yourself.    Recognize the warning signs:  Abrupt changes in personality, positive or negative- including increase in energy   Giving away possessions  Change in eating patterns- significant weight changes-  positive or negative  Change in sleeping patterns- unable to sleep or sleeping all the time   Unwillingness or inability to communicate  Depression  Unusual  sadness, discouragement and loneliness  Talk of wanting to die  Neglect of personal appearance   Rebelliousness- reckless behavior  Withdrawal from people/activities they love  Confusion- inability to concentrate     If you or a loved one observes any of these behaviors or has concerns about self-harm, here's what you can do:  Talk about it- your feelings and reasons for harming yourself  Remove any means that you might use to hurt yourself (examples: pills, rope, extension cords, firearm)  Get professional help from the community (Mental Health, Substance Abuse, psychological counseling)  Do not be alone:Call your Safe Contact- someone whom you trust who will be there for you.  Call your local CRISIS HOTLINE 420-8045 or 136-545-0476  Call your local Children's Mobile Crisis Response Team [place] (015) 987-3172 or www.Bitstamp  Call the toll free National Suicide Prevention Hotlines   National Suicide Prevention Lifeline 632-134-KAFB (1684)  National Hope Line Network 800-SUICIDE (019-8541)

## 2022-06-02 NOTE — H&P
Hospital Medicine History & Physical Note    Date of Service  6/1/2022    Primary Care Physician  Pcp Not In Computer    Consultants  None        Code Status  Full Code    Chief Complaint  Chief Complaint   Patient presents with   • Nausea/Vomiting/Diarrhea     Diarrhea x5 months. He now notes that stool is liquid and black.    • Flank Pain     Bilateral that started this AM. Denies pain with urination or blood in urine   • Dizziness   • Headache       History of Presenting Illness  Mando Saleh is a 21 y.o. male who presented 6/1/2022 with nausea vomiting and diarrhea.  Patient has been having diarrhea for the last 5 months on and off with some blood approximately corresponding to once a week with dark stools.  Patient had some sushi yesterday and today morning he started having heartburn after which she had nausea and vomiting and he vomited multiple times.  Soon the heartburn went away but the nausea and the vomiting continued.  He also has been having continuous diarrhea multiple episodes throughout the day.  Most of the diarrhea today has been black.  He has also been dizzy has an associated headache.  No fever no chills no similar episodes in the past.  He also continues to have back pain all across the lower back which has been going on along with the diarrhea.    I discussed the plan of care with patient and family.    Review of Systems  Review of Systems   Constitutional: Negative for chills and fever.   HENT: Negative for hearing loss and tinnitus.    Eyes: Negative for blurred vision and double vision.   Respiratory: Negative for cough.    Cardiovascular: Negative for chest pain and palpitations.   Gastrointestinal: Positive for abdominal pain, blood in stool, diarrhea, heartburn, nausea and vomiting.   Genitourinary: Positive for urgency. Negative for dysuria.   Musculoskeletal: Positive for neck pain. Negative for myalgias.   Skin: Negative for itching and rash.   Neurological: Positive for  dizziness and headaches.   Endo/Heme/Allergies: Negative for environmental allergies. Does not bruise/bleed easily.       Past Medical History  None    Surgical History  Acl repair    Family History    Family history reviewed with patient. There is no family history that is pertinent to the chief complaint.     Social History   reports that he has never smoked. He has never used smokeless tobacco. He reports current alcohol use. He reports that he does not use drugs.    Allergies  Allergies   Allergen Reactions   • Pertussis Vaccine        Medications  Prior to Admission Medications   Prescriptions Last Dose Informant Patient Reported? Taking?   Non Formulary Request 5/30/2022 at Unknown time  Yes Yes   Sig: Take 3 Capsules by mouth every day. **OTC TESTOSTERONE SUPPLEMENT**      Facility-Administered Medications: None       Physical Exam  Temp:  [36.3 °C (97.3 °F)-37 °C (98.6 °F)] 37 °C (98.6 °F)  Pulse:  [] 96  Resp:  [14-17] 17  BP: ()/(52-86) 105/86  SpO2:  [92 %-98 %] 94 %  Blood Pressure: 105/86   Temperature: 37 °C (98.6 °F)   Pulse: 96   Respiration: 17   Pulse Oximetry: 94 %       Physical Exam  Constitutional:       Appearance: Normal appearance.   HENT:      Head: Normocephalic and atraumatic.      Nose: Nose normal.      Mouth/Throat:      Mouth: Mucous membranes are moist.      Pharynx: Oropharynx is clear.   Eyes:      Conjunctiva/sclera: Conjunctivae normal.      Pupils: Pupils are equal, round, and reactive to light.   Cardiovascular:      Rate and Rhythm: Normal rate and regular rhythm.      Pulses: Normal pulses.      Heart sounds: Normal heart sounds.   Pulmonary:      Effort: Pulmonary effort is normal.      Breath sounds: Normal breath sounds.   Abdominal:      General: Abdomen is flat. Bowel sounds are normal.   Musculoskeletal:         General: Normal range of motion.   Skin:     General: Skin is warm and dry.   Neurological:      General: No focal deficit present.      Mental  Status: He is alert and oriented to person, place, and time.         Laboratory:  Recent Labs     06/01/22  1105   WBC 11.7*   RBC 5.53   HEMOGLOBIN 16.9   HEMATOCRIT 47.7   MCV 86.3   MCH 30.6   MCHC 35.4*   RDW 38.7   PLATELETCT 247   MPV 8.8*     Recent Labs     06/01/22  1105   SODIUM 137   POTASSIUM 4.5   CHLORIDE 101   CO2 24   GLUCOSE 114*   BUN 17   CREATININE 1.02   CALCIUM 9.3     Recent Labs     06/01/22  1105   ALTSGPT 22   ASTSGOT 26   ALKPHOSPHAT 79   TBILIRUBIN 0.8   LIPASE 19   GLUCOSE 114*         No results for input(s): NTPROBNP in the last 72 hours.      No results for input(s): TROPONINT in the last 72 hours.    Imaging:  CT-ABDOMEN-PELVIS WITH   Final Result      There is a fluid-containing blind ending pouch arising from the antimesenteric side of the distal ileum. Tracking along its lateral margin is a normal-appearing appendix. This could be a Meckel's diverticulum. If there is clinical suspicion a Tc-NA    pertechnetate scan could be of use.      NM-MECKELS SCAN    (Results Pending)           Assessment/Plan:  Justification for Admission Status  I anticipate this patient is appropriate for observation status at this time because Less than 2 midnights for W/U of LGIB    * GIB (gastrointestinal bleeding)- (present on admission)  Assessment & Plan  Patient with black stools and FOBT positive along with what on a CT scan appears could be a potential Meckel's diverticulum we will investigate this further  -We will order a Meckel scan  -If this is negative a GI consult for potential colonoscopy could be warranted    Nausea & vomiting  Assessment & Plan  Nausea vomiting along with diarrhea could also be gastroenteritis after eating sushi  -We will treat symptomatically with Zofran for the nausea and vomiting  -For the diarrhea will check stool culture and sensitivities stool WBCs   -If negative loperamide could be added  -We will give him IV fluids for the dizziness      VTE prophylaxis:  SCDs/TEDs

## 2022-06-02 NOTE — DISCHARGE SUMMARY
Discharge Summary    CHIEF COMPLAINT ON ADMISSION  Chief Complaint   Patient presents with   • Nausea/Vomiting/Diarrhea     Diarrhea x5 months. He now notes that stool is liquid and black.    • Flank Pain     Bilateral that started this AM. Denies pain with urination or blood in urine   • Dizziness   • Headache       Reason for Admission  Diarrhea; Back Pain; VD     Admission Date  6/1/2022    CODE STATUS  Full Code    HPI & HOSPITAL COURSE    Mr. Mando Saleh is a 21-year-old male with a PMHx of diarrhea for the past 5 months, admitted on 6/1/2022 due to nausea, vomiting, and diarrhea.    Patient reports that he has been having intermittent diarrhea for the past 5 months of which he recently had follow-up with his PCP in regards to this.  His PCP at that time recommended for him to take Imodium, but his diarrhea persisted.  In the past week, patient noticed dark stools.  Patient had some sushi prior to admission and unfortunately starting to have some heartburn, and after that had nausea and vomiting multiple times.  The heartburn resolved, but the nausea and vomiting continued.  Patient also had continuous diarrhea multiple episodes throughout the day.  Most of his diarrhea on admission day was noted dark/black.  Patient also complains of dizziness associated with headache.  Patient denies any fever, no chills, no other symptoms noted.  Patient continued to have pain across his lower back and has been going on since he started to have intermittent diarrhea.    In ER, notable lab findings noted leukocytosis of 11.7, glucose 114, .  Urinalysis was negative for anything acute.  Occult blood feces was positive.  Patient was admitted for further observation.    During this course of stay, patient's H/H was monitored with last being at 14.6/41.6.  Patient reports continued diarrhea with dark stool.  GI, Dr. Dr Del Cid with [facility] was consulted.  Per GI, since patient is stable and no concerns for significant GI bleed,  patient will follow as an outpatient for an EGD and colonoscopy.  Patient will be prescribed Prilosec 20 mg daily until seen by GI.  Patient counseled and educated in regards to not taking any aspirin, NSAIDs, and alcohol until cleared by gastroenterology.  Discussed case with patient along with mother who was at bedside.  All questions and concerns answered prior to being discharged.  Patient discharged home.      Therefore, he is discharged in guarded and stable condition to home with close outpatient follow-up.    The patient recovered much more quickly than anticipated on admission.    Discharge Date  06/02/22      FOLLOW UP ITEMS POST DISCHARGE  Please call [phone] to schedule PCP appointment for patient.    Required specialty appointments include:       Discharge Instructions per KRISHAN NolandPViancaRViancaNVianca    -Follow-up with PCP s/p hospitalization  -Make an appointment with Dr. Antonio, digestive health associate for an expected EGD/colonoscopy procedure  -Start taking Prilosec 20 mg daily until cleared by GI    DIET: As tolerated    ACTIVITY: As tolerated    DIAGNOSIS: N/V/D    Return to ER if symptoms persist, chest pain, palpitations, shortness of breath, numbness, tingling, weakness, and high fevers.      DISCHARGE DIAGNOSES  Principal Problem:    GIB (gastrointestinal bleeding) POA: Yes  Resolved Problems:    Nausea & vomiting POA: Unknown      FOLLOW UP    Yoav Ferrera M.D.  [address] [place] [address] [address] [place] [address]  [phone]    Schedule an appointment as soon as possible for a visit today  Make an appt for and EGD and colonoscopy      MEDICATIONS ON DISCHARGE     Medication List      START taking these medications      Instructions   omeprazole 20 MG delayed-release capsule  Commonly known as: PRILOSEC   Take 1 Capsule by mouth every day for 30 days.  Dose: 20 mg        CONTINUE taking these medications      Instructions   Non Formulary Request   Take 3 Capsules by mouth every day.  **OTC TESTOSTERONE SUPPLEMENT**  Dose: 3 Capsule            Allergies  Allergies   Allergen Reactions   • Pertussis Vaccine        DIET  Orders Placed This Encounter   Procedures   • Diet Order Diet: Clear Liquid     Standing Status:   Standing     Number of Occurrences:   1     Order Specific Question:   Diet:     Answer:   Clear Liquid [10]       ACTIVITY  As tolerated.  Weight bearing as tolerated    CONSULTATIONS  Gastroenterology    PROCEDURES  NONE    IMAGING  NM-MECKELS SCAN   Final Result      Normal Meckel?s scan.  No evidence of Meckel?s diverticulum.      CT-ABDOMEN-PELVIS WITH   Final Result      There is a fluid-containing blind ending pouch arising from the antimesenteric side of the distal ileum. Tracking along its lateral margin is a normal-appearing appendix. This could be a Meckel's diverticulum. If there is clinical suspicion a Tc-NA    pertechnetate scan could be of use.            LABORATORY  Lab Results   Component Value Date    SODIUM 137 06/02/2022    POTASSIUM 4.1 06/02/2022    CHLORIDE 104 06/02/2022    CO2 23 06/02/2022    GLUCOSE 102 (H) 06/02/2022    BUN 18 06/02/2022    CREATININE 1.19 06/02/2022        Lab Results   Component Value Date    WBC 6.3 06/02/2022    HEMOGLOBIN 14.6 06/02/2022    HEMATOCRIT 41.6 (L) 06/02/2022    PLATELETCT 202 06/02/2022        Total time of the discharge process exceeds 36 minutes.    ==========================================================================================================  Please note that this dictation was created using voice recognition software. I have made every reasonable attempt to correct obvious errors, but there may be errors of grammar and possibly content that I did not discover before finalizing the note.    Electronically signed by:  NARGIS Terry, MSN, APRN, FNP-C  Hospitalist Services  [facility]  [phone]  [email or web address]  06/02/22                 [id number]

## 2022-06-02 NOTE — ASSESSMENT & PLAN NOTE
Patient with black stools and FOBT positive along with what on a CT scan appears could be a potential Meckel's diverticulum we will investigate this further  -We will order a Meckel scan  -If this is negative a GI consult for potential colonoscopy could be warranted

## 2022-06-02 NOTE — HOSPITAL COURSE
Mr. Mando Saleh is a 21-year-old male with a PMHx of diarrhea for the past 5 months, admitted on 6/1/2022 due to nausea, vomiting, and diarrhea.    Patient reports that he has been having intermittent diarrhea for the past 5 months of which he recently had follow-up with his PCP in regards to this.  His PCP at that time recommended for him to take Imodium, but his diarrhea persisted.  In the past week, patient noticed dark stools.  Patient had some sushi prior to admission and unfortunately starting to have some heartburn, and after that had nausea and vomiting multiple times.  The heartburn resolved, but the nausea and vomiting continued.  Patient also had continuous diarrhea multiple episodes throughout the day.  Most of his diarrhea on admission day was noted dark/black.  Patient also complains of dizziness associated with headache.  Patient denies any fever, no chills, no other symptoms noted.  Patient continued to have pain across his lower back and has been going on since he started to have intermittent diarrhea.    In ER, notable lab findings noted leukocytosis of 11.7, glucose 114, .  Urinalysis was negative for anything acute.  Occult blood feces was positive.  Patient was admitted for further observation.    During this course of stay, patient's H/H was monitored with last being at 14.6/41.6.  Patient reports continued diarrhea with dark stool.  GI, . Dr Del Cid with [facility] was consulted.  Per GI, since patient is stable and no concerns for significant GI bleed, patient will follow as an outpatient for an EGD and colonoscopy.  Patient will be prescribed Prilosec 20 mg daily until seen by GI.  Patient counseled and educated in regards to not taking any aspirin, NSAIDs, and alcohol until cleared by gastroenterology.  Discussed case with patient along with mother who was at bedside.  All questions and concerns answered prior to being discharged.  Patient discharged home.

## 2022-06-03 ENCOUNTER — HOSPITAL ENCOUNTER (EMERGENCY)
Facility: MEDICAL CENTER | Age: 22
End: 2022-06-04
Payer: COMMERCIAL

## 2022-06-03 VITALS
WEIGHT: 242.51 LBS | TEMPERATURE: 96.9 F | SYSTOLIC BLOOD PRESSURE: 136 MMHG | DIASTOLIC BLOOD PRESSURE: 74 MMHG | RESPIRATION RATE: 18 BRPM | HEIGHT: 74 IN | HEART RATE: 96 BPM | OXYGEN SATURATION: 98 % | BODY MASS INDEX: 31.12 KG/M2

## 2022-06-03 LAB
BASOPHILS # BLD AUTO: 0.3 % (ref 0–1.8)
BASOPHILS # BLD: 0.02 K/UL (ref 0–0.12)
E COLI SXT1+2 STL IA: NORMAL
EOSINOPHIL # BLD AUTO: 0.1 K/UL (ref 0–0.51)
EOSINOPHIL NFR BLD: 1.5 % (ref 0–6.9)
ERYTHROCYTE [DISTWIDTH] IN BLOOD BY AUTOMATED COUNT: 37.4 FL (ref 35.9–50)
HCT VFR BLD AUTO: 46.4 % (ref 42–52)
HGB BLD-MCNC: 16.5 G/DL (ref 14–18)
IMM GRANULOCYTES # BLD AUTO: 0.02 K/UL (ref 0–0.11)
IMM GRANULOCYTES NFR BLD AUTO: 0.3 % (ref 0–0.9)
LYMPHOCYTES # BLD AUTO: 1.28 K/UL (ref 1–4.8)
LYMPHOCYTES NFR BLD: 19.7 % (ref 22–41)
MCH RBC QN AUTO: 30.4 PG (ref 27–33)
MCHC RBC AUTO-ENTMCNC: 35.6 G/DL (ref 33.7–35.3)
MCV RBC AUTO: 85.6 FL (ref 81.4–97.8)
MONOCYTES # BLD AUTO: 0.44 K/UL (ref 0–0.85)
MONOCYTES NFR BLD AUTO: 6.8 % (ref 0–13.4)
NEUTROPHILS # BLD AUTO: 4.64 K/UL (ref 1.82–7.42)
NEUTROPHILS NFR BLD: 71.4 % (ref 44–72)
NRBC # BLD AUTO: 0 K/UL
NRBC BLD-RTO: 0 /100 WBC
PLATELET # BLD AUTO: 231 K/UL (ref 164–446)
PMV BLD AUTO: 8.7 FL (ref 9–12.9)
RBC # BLD AUTO: 5.42 M/UL (ref 4.7–6.1)
SIGNIFICANT IND 70042: NORMAL
SITE SITE: NORMAL
SOURCE SOURCE: NORMAL
WBC # BLD AUTO: 6.5 K/UL (ref 4.8–10.8)

## 2022-06-03 PROCEDURE — 80053 COMPREHEN METABOLIC PANEL: CPT

## 2022-06-03 PROCEDURE — 85025 COMPLETE CBC W/AUTO DIFF WBC: CPT

## 2022-06-03 PROCEDURE — 83690 ASSAY OF LIPASE: CPT

## 2022-06-03 PROCEDURE — 302449 STATCHG TRIAGE ONLY (STATISTIC)

## 2022-06-03 ASSESSMENT — FIBROSIS 4 INDEX: FIB4 SCORE: 0.6

## 2022-06-04 LAB
ALBUMIN SERPL BCP-MCNC: 4.5 G/DL (ref 3.2–4.9)
ALBUMIN/GLOB SERPL: 1.5 G/DL
ALP SERPL-CCNC: 64 U/L (ref 30–99)
ALT SERPL-CCNC: 20 U/L (ref 2–50)
ANION GAP SERPL CALC-SCNC: 12 MMOL/L (ref 7–16)
AST SERPL-CCNC: 17 U/L (ref 12–45)
BACTERIA STL CULT: NORMAL
BILIRUB SERPL-MCNC: 0.7 MG/DL (ref 0.1–1.5)
BUN SERPL-MCNC: 11 MG/DL (ref 8–22)
C JEJUNI+C COLI AG STL QL: NORMAL
CALCIUM SERPL-MCNC: 9.2 MG/DL (ref 8.5–10.5)
CHLORIDE SERPL-SCNC: 101 MMOL/L (ref 96–112)
CO2 SERPL-SCNC: 24 MMOL/L (ref 20–33)
CREAT SERPL-MCNC: 1.07 MG/DL (ref 0.5–1.4)
E COLI SXT1+2 STL IA: NORMAL
GFR SERPLBLD CREATININE-BSD FMLA CKD-EPI: 100 ML/MIN/1.73 M 2
GLOBULIN SER CALC-MCNC: 3 G/DL (ref 1.9–3.5)
GLUCOSE SERPL-MCNC: 113 MG/DL (ref 65–99)
LACTOFERRIN STL QL IA: POSITIVE
LIPASE SERPL-CCNC: 20 U/L (ref 11–82)
POTASSIUM SERPL-SCNC: 3.7 MMOL/L (ref 3.6–5.5)
PROT SERPL-MCNC: 7.5 G/DL (ref 6–8.2)
SIGNIFICANT IND 70042: NORMAL
SITE SITE: NORMAL
SODIUM SERPL-SCNC: 137 MMOL/L (ref 135–145)
SOURCE SOURCE: NORMAL

## 2022-06-04 NOTE — ED TRIAGE NOTES
"Chief Complaint   Patient presents with   • Diarrhea   • Abdominal Pain     Pt presents with the above complaints. Pt was seen recently for bloody stools. Since pt has been having diarrhea every hour. Pt complains of abdominal pain after eating. Abdominal pain has subsided. Described as burning. Denies blood in stool currently.    /74   Pulse 96   Temp 36.1 °C (96.9 °F) (Temporal)   Resp 18   Ht 1.88 m (6' 2\")   Wt 110 kg (242 lb 8.1 oz)   SpO2 98%   BMI 31.14 kg/m²     "

## 2022-06-04 NOTE — ED NOTES
Patient states that they are feeling better and want to leave the Emergency Department without being seen by an ERP. Patient informed of risks associated with leaving before being seen. Patient instructed to come back or call 911 if his condition worsens. Patient signed AMA form and understands risks.

## 2023-03-29 ENCOUNTER — OFFICE VISIT (OUTPATIENT)
Dept: URGENT CARE | Facility: PHYSICIAN GROUP | Age: 23
End: 2023-03-29

## 2023-03-29 VITALS
OXYGEN SATURATION: 99 % | TEMPERATURE: 97.9 F | HEIGHT: 74 IN | SYSTOLIC BLOOD PRESSURE: 118 MMHG | WEIGHT: 233 LBS | BODY MASS INDEX: 29.9 KG/M2 | DIASTOLIC BLOOD PRESSURE: 70 MMHG | HEART RATE: 80 BPM | RESPIRATION RATE: 18 BRPM

## 2023-03-29 DIAGNOSIS — Z02.89 ENCOUNTER FOR EXAMINATION REQUIRED BY DEPARTMENT OF TRANSPORTATION (DOT): ICD-10-CM

## 2023-03-29 PROCEDURE — 7100 PR DOT PHYSICAL: Performed by: NURSE PRACTITIONER

## 2023-03-29 ASSESSMENT — FIBROSIS 4 INDEX: FIB4 SCORE: 0.38

## 2023-03-29 NOTE — PROGRESS NOTES
"  Subjective:     Mando Saleh is a 22 y.o. male who presents for Other (DOT Physical )      Presents for DOT exam.     Other      History reviewed. No pertinent past medical history.    History reviewed. No pertinent surgical history.    Social History     Socioeconomic History    Marital status: Single     Spouse name: Not on file    Number of children: Not on file    Years of education: Not on file    Highest education level: Not on file   Occupational History    Not on file   Tobacco Use    Smoking status: Never    Smokeless tobacco: Never   Vaping Use    Vaping Use: Never used   Substance and Sexual Activity    Alcohol use: Yes     Comment: 1-2    Drug use: Never    Sexual activity: Not on file   Other Topics Concern    Not on file   Social History Narrative    Not on file     Social Determinants of Health     Financial Resource Strain: Not on file   Food Insecurity: Not on file   Transportation Needs: Not on file   Physical Activity: Not on file   Stress: Not on file   Social Connections: Not on file   Intimate Partner Violence: Not on file   Housing Stability: Not on file        History reviewed. No pertinent family history.     Allergies   Allergen Reactions    Pertussis Vaccine        Review of Systems   All other systems reviewed and are negative.     Objective:   /70   Pulse 80   Temp 36.6 °C (97.9 °F)   Resp 18   Ht 1.88 m (6' 2\")   Wt 106 kg (233 lb)   SpO2 99%   BMI 29.92 kg/m²     Physical Exam  Vitals reviewed.   Constitutional:       General: He is not in acute distress.     Appearance: He is well-developed. He is not toxic-appearing.   HENT:      Head: Normocephalic and atraumatic.      Right Ear: External ear normal.      Left Ear: External ear normal.      Nose: Nose normal.      Mouth/Throat:      Mouth: Mucous membranes are moist.      Pharynx: Oropharynx is clear.   Eyes:      Conjunctiva/sclera: Conjunctivae normal.   Cardiovascular:      Rate and Rhythm: Normal rate and " regular rhythm.   Pulmonary:      Effort: Pulmonary effort is normal.      Breath sounds: Normal breath sounds.   Abdominal:      General: There is no distension.      Palpations: Abdomen is soft.   Musculoskeletal:         General: Normal range of motion.      Cervical back: Normal range of motion.   Skin:     General: Skin is warm and dry.      Findings: No rash.   Neurological:      Mental Status: He is alert and oriented to person, place, and time.      GCS: GCS eye subscore is 4. GCS verbal subscore is 5. GCS motor subscore is 6.   Psychiatric:         Speech: Speech normal.         Behavior: Behavior normal.         Thought Content: Thought content normal.         Judgment: Judgment normal.       Assessment/Plan:   1. Encounter for examination required by Department of Transportation (DOT)    Patient presents for DOT exam. See scanned documentation for physical and health questionnaire. Denies SOB, CP or dizziness on exertion. Denies h/o respiratory or cardiac pathology, or seizures. Reviewed PMPD, no medications listed    Passes vision requirements. Discussed deficit in right eye, encouraged Optometry f/u.     Differential diagnosis, natural history, supportive care, and indications for immediate follow-up discussed.